# Patient Record
Sex: MALE | NOT HISPANIC OR LATINO | URBAN - METROPOLITAN AREA
[De-identification: names, ages, dates, MRNs, and addresses within clinical notes are randomized per-mention and may not be internally consistent; named-entity substitution may affect disease eponyms.]

---

## 2018-09-20 ENCOUNTER — IMPORTED ENCOUNTER (OUTPATIENT)
Dept: URBAN - METROPOLITAN AREA CLINIC 1 | Facility: CLINIC | Age: 68
End: 2018-09-20

## 2018-10-01 ENCOUNTER — IMPORTED ENCOUNTER (OUTPATIENT)
Dept: URBAN - METROPOLITAN AREA CLINIC 1 | Facility: CLINIC | Age: 68
End: 2018-10-01

## 2018-10-01 PROBLEM — Z79.84: Noted: 2018-10-01

## 2018-10-01 PROBLEM — E11.9: Noted: 2018-10-01

## 2018-10-01 PROBLEM — H11.153: Noted: 2018-10-01

## 2018-10-01 PROBLEM — H16.143: Noted: 2018-10-01

## 2018-10-01 PROBLEM — H04.123: Noted: 2018-10-01

## 2018-10-01 PROBLEM — H40.023: Noted: 2018-10-01

## 2018-10-01 PROBLEM — H25.813: Noted: 2018-10-01

## 2018-10-01 PROCEDURE — 92004 COMPRE OPH EXAM NEW PT 1/>: CPT

## 2018-10-01 PROCEDURE — 92015 DETERMINE REFRACTIVE STATE: CPT

## 2018-10-01 PROCEDURE — 92250 FUNDUS PHOTOGRAPHY W/I&R: CPT

## 2018-10-01 NOTE — PATIENT DISCUSSION
1.  DM Type II (Oral Meds) -- Without sign of diabetic retinopathy and no blot heme on dilated retinal examination today OU and no Macular Edema OU: Discussed the pathophysiology of diabetes and its effect on the eye and risk of blindness. Stressed the importance of strong glucose control. Advised of importance of at least yearly dilated examinations but to contact us immediately for any problems or concerns. 2. Cataracts OU -- Visually Significant secondary to glare discussed the risks benefits alternatives and limitations of cataract surgery. The patient stated a full understanding and a desire to proceed with the procedure. The patient will need to return for preop appointment with cataract measurements and to have any additional questions answered and start pre-operative eye drops as directed. Not examined by [facility] today. Phaco PCL OS first then OD. Otherwise follow-up in 3 WKS for a 10 / 24-2 HVF / OCT OU. 3.  Glaucoma Suspect OU (CD: 0.75 OU) -- IOP slightly elevated today at 23 OU. Positive family h/o Glaucoma. AA. Ordered & obtained Disc Photo today OU Photo showed Optic Nerve Cupping OU. Will have patient return in 3 WKS for further w/u & testing. Patient is considered high risk. Condition was discussed with patient and patient understands. Will continue to monitor patient for any progression in condition. Patient was advised to call us with any problems questions or concerns. 4.  LEXUS w/ PEK OU -- Recommend the frequent use of OTC AT's BID-QID OU (sample & coupon given). 5.  Pinguecula OU -- Use of sunglasses when exposed to UV light and observation is recommended. Letter to PCP. Return for a 10 / [place] [facility] / H and P appointment with Dr. Elyse Ball. Return for an appointment in 3 WKS for a 10 / 24-2 HVF / OCT OU with Dr. Tuyet Silva.

## 2018-10-25 ENCOUNTER — IMPORTED ENCOUNTER (OUTPATIENT)
Dept: URBAN - METROPOLITAN AREA CLINIC 1 | Facility: CLINIC | Age: 68
End: 2018-10-25

## 2018-10-25 PROBLEM — H40.023: Noted: 2018-10-25

## 2018-10-25 PROBLEM — H25.813: Noted: 2018-10-25

## 2018-10-25 PROCEDURE — 92083 EXTENDED VISUAL FIELD XM: CPT

## 2018-10-25 PROCEDURE — 99213 OFFICE O/P EST LOW 20 MIN: CPT

## 2018-10-25 PROCEDURE — 92133 CPTRZD OPH DX IMG PST SGM ON: CPT

## 2018-10-25 NOTE — PATIENT DISCUSSION
1.  Glaucoma Suspect OU (CD 0.75 OU): OCT and HVF WNL OU. IOP stable. AA. Family hx. Patient is considered high risk. Condition was discussed with patient and patient understands. Will continue to monitor patient for any progression in condition. Patient was advised to call us with any problems questions or concerns. 2.  Cataract OU: RTC as scheduled for Phaco/PCL w/ PMG 3.  LEXUS w/ PEK OU -- Recommend the frequent use of OTC AT's BID-QID OU (sample & coupon given). 4.  Pinguecula OU5. H/o DM w/o DR Evelio Sanchez for an appointment for Return as scheduled with Dr. Quinn Arriaza.

## 2018-10-26 ENCOUNTER — IMPORTED ENCOUNTER (OUTPATIENT)
Dept: URBAN - METROPOLITAN AREA CLINIC 1 | Facility: CLINIC | Age: 68
End: 2018-10-26

## 2018-10-26 PROBLEM — H16.143: Noted: 2018-10-26

## 2018-10-26 PROBLEM — H11.153: Noted: 2018-10-26

## 2018-10-26 PROBLEM — H04.123: Noted: 2018-10-26

## 2018-10-26 PROBLEM — H25.813: Noted: 2018-10-26

## 2018-10-26 PROBLEM — H40.023: Noted: 2018-10-26

## 2018-10-26 PROCEDURE — 92012 INTRM OPH EXAM EST PATIENT: CPT

## 2018-10-26 NOTE — PATIENT DISCUSSION
1.  Cataract OU:  Visually Significant secondary to glare discussed the risks benefits alternatives and limitations of cataract surgery. The patient stated a full understanding and a desire to proceed with the procedure. Instructed/ discussed with patient if gtts are expensive (over 150 dollars) to call our office so we can recommend alternatives. Pt understood. Pt understands they will need glasses post-op to achieve their best corrected vision. **Discussed with patient and  regarding patient needing to hold taking Metformin AM of surgery also discussed patient needs to skip night before sx dose of Metformin. Take all other medications as usual with sips of water only. Phaco PCL OS then OD. 2.  Glaucoma Suspect OU (CD 0.75 OU): IOP stable on no gtts Past w/u neg. AA. Family hx. Patient is considered high risk. 3.  LEXUS w/ PEK OU- Use ATs BID OU routinely. 4.  Pinguecula OU -- observe. 5.  H/o DM w/o DR OU - Glucose control. Return for an appointment in as scheduled for Phaco/ PCL OS then OD. with Dr. Cyn Vidal.

## 2018-11-07 ENCOUNTER — IMPORTED ENCOUNTER (OUTPATIENT)
Dept: URBAN - METROPOLITAN AREA CLINIC 1 | Facility: CLINIC | Age: 68
End: 2018-11-07

## 2018-11-08 ENCOUNTER — IMPORTED ENCOUNTER (OUTPATIENT)
Dept: URBAN - METROPOLITAN AREA CLINIC 1 | Facility: CLINIC | Age: 68
End: 2018-11-08

## 2018-11-08 PROBLEM — Z96.1: Noted: 2018-11-08

## 2018-11-08 PROCEDURE — 99024 POSTOP FOLLOW-UP VISIT: CPT

## 2018-11-19 ENCOUNTER — IMPORTED ENCOUNTER (OUTPATIENT)
Dept: URBAN - METROPOLITAN AREA CLINIC 1 | Facility: CLINIC | Age: 68
End: 2018-11-19

## 2018-11-19 PROBLEM — Z96.1: Noted: 2018-11-19

## 2018-11-19 PROBLEM — H25.811: Noted: 2018-11-19

## 2018-11-19 PROCEDURE — 92136 OPHTHALMIC BIOMETRY: CPT

## 2018-11-19 NOTE — PATIENT DISCUSSION
1.  Cataract OD: Visually Significant discussed the risks benefits alternatives and limitations of cataract surgery. The patient stated a full understanding and a desire to proceed with the procedure. The patient will need to start pre-operative eye drops as directed. Proceed w/ phaco PCL OD Pt understands they will need glasses post-op to achieve their best corrected vision. 2.  POW#1  CE/IOL Standard OS doing well. Discontinue OcufloxContinue Lotemax/Durezol/Prednisolone BID until gone. Continue Prolensa/Ilevro/Acular QD until gone. 3. Return for an appointment for sx OD with Dr. Josef Díaz.

## 2018-11-28 ENCOUNTER — IMPORTED ENCOUNTER (OUTPATIENT)
Dept: URBAN - METROPOLITAN AREA CLINIC 1 | Facility: CLINIC | Age: 68
End: 2018-11-28

## 2018-11-29 ENCOUNTER — IMPORTED ENCOUNTER (OUTPATIENT)
Dept: URBAN - METROPOLITAN AREA CLINIC 1 | Facility: CLINIC | Age: 68
End: 2018-11-29

## 2018-11-29 PROBLEM — Z96.1: Noted: 2018-11-29

## 2018-11-29 PROCEDURE — 99024 POSTOP FOLLOW-UP VISIT: CPT

## 2018-11-29 NOTE — PATIENT DISCUSSION
POD#1 CE/IOL OD doing well. Continue all 3 gtts as prescribed Use Besivance Tid OD for 5 days then D/cLotemax Bid ou Prolensa qd ouPost op Warnings Reiterated RTC as scheduled

## 2018-12-20 ENCOUNTER — IMPORTED ENCOUNTER (OUTPATIENT)
Dept: URBAN - METROPOLITAN AREA CLINIC 1 | Facility: CLINIC | Age: 68
End: 2018-12-20

## 2018-12-20 PROBLEM — Z96.1: Noted: 2018-12-20

## 2018-12-20 PROCEDURE — 99024 POSTOP FOLLOW-UP VISIT: CPT

## 2018-12-20 NOTE — PATIENT DISCUSSION
POM#1 CE/IOL OU (Standard OU) doing well. Use Lotemax BID OD till out Use Prolensa Qdaily OD till out MRX for glasses givenReturn for an appointment in October 30 with Dr. Cosmo Gupta.

## 2019-10-23 ENCOUNTER — IMPORTED ENCOUNTER (OUTPATIENT)
Dept: URBAN - METROPOLITAN AREA CLINIC 1 | Facility: CLINIC | Age: 69
End: 2019-10-23

## 2019-10-23 PROBLEM — H40.013: Noted: 2019-10-23

## 2019-10-23 PROBLEM — Z79.4: Noted: 2019-10-23

## 2019-10-23 PROBLEM — Z79.84: Noted: 2019-10-23

## 2019-10-23 PROBLEM — E11.9: Noted: 2019-10-23

## 2019-10-23 PROBLEM — H43.811: Noted: 2019-10-23

## 2019-10-23 PROBLEM — H26.493: Noted: 2019-10-23

## 2019-10-23 PROBLEM — H18.413: Noted: 2019-10-23

## 2019-10-23 PROBLEM — Z96.1: Noted: 2019-10-23

## 2019-10-23 PROCEDURE — 92014 COMPRE OPH EXAM EST PT 1/>: CPT

## 2019-10-23 NOTE — PATIENT DISCUSSION
1.  DM Type II (Insulin) -- Without sign of diabetic retinopathy and no blot heme on dilated retinal examination today OU No Macular Edema. Discussed the pathophysiology of diabetes and its effect on the eye and risk of blindness. Stressed the importance of strong glucose control. Advised of importance of at least yearly dilated examinations but to contact us immediately for any problems or concerns. 2. Glaucoma Suspect OU -- (0.85 OU) IOP 13 OU. AA. Past w/u negative. Patient is considered Low Risk. Will continue to monitor patient for any progression in condition. 3.  PCO OU -- (Posterior Capsule Opacification)  Observe and consider yag cap when pt feels pco visually significant and visual acuity decreases to appropriate level. 4. PVD w/o Tear OD - RD precautions. 5.  Pseudophakia OU -- Standard OU. 6. Arcus OUAll conditions discussed with patient and  today. Patient defers MRx today. Return for an appointment in 1 year fora 30/OCT with Dr. Nelson Vizcarra.

## 2020-10-21 ENCOUNTER — IMPORTED ENCOUNTER (OUTPATIENT)
Dept: URBAN - METROPOLITAN AREA CLINIC 1 | Facility: CLINIC | Age: 70
End: 2020-10-21

## 2020-10-21 PROBLEM — E11.9: Noted: 2020-10-21

## 2020-10-21 PROBLEM — H40.013: Noted: 2020-10-21

## 2020-10-21 PROBLEM — Z79.4: Noted: 2020-10-21

## 2020-10-21 PROBLEM — H43.811: Noted: 2020-10-21

## 2020-10-21 PROBLEM — H18.413: Noted: 2020-10-21

## 2020-10-21 PROBLEM — H26.493: Noted: 2020-10-21

## 2020-10-21 PROCEDURE — 92133 CPTRZD OPH DX IMG PST SGM ON: CPT

## 2020-10-21 PROCEDURE — 92014 COMPRE OPH EXAM EST PT 1/>: CPT

## 2020-10-21 NOTE — PATIENT DISCUSSION
1.  DM Type II (Insulin) without sign of diabetic retinopathy and no blot heme on dilated retinal examination today OU No Macular Edema -- Discussed the pathophysiology of diabetes and its effect on the eye and risk of blindness. Stressed the importance of strong glucose control. Advised of importance of at least yearly dilated examinations but to contact us immediately for any problems or concerns. 2. Glaucoma Suspect OU -- (0.85 OU) IOP 13 OU. AA. Past w/u negative. Patient is considered Low Risk. Will continue to monitor patient for any progression in condition. 3.  PCO OU -- (Posterior Capsule Opacification)   Observe and consider yag cap when pt feels pco visually significant and visual acuity decreases to appropriate level. 4. PVD w/o Tear OD -- Old Stable. RD Precautions. 5.  Arcus OUPatient deferred MRx today. Letter to PCP. Return for an appointment in 1 year 30/OCT with Dr. Dona Bates.

## 2022-04-02 ASSESSMENT — KERATOMETRY
OD_K1POWER_DIOPTERS: 41.25
OS_K1POWER_DIOPTERS: 40.75
OS_K2POWER_DIOPTERS: 41.50
OS_AXISANGLE2_DEGREES: 115
OD_AXISANGLE_DEGREES: 039
OD_K2POWER_DIOPTERS: 40.75
OS_AXISANGLE_DEGREES: 025
OD_AXISANGLE2_DEGREES: 129

## 2022-04-02 ASSESSMENT — TONOMETRY
OD_IOP_MMHG: 15
OD_IOP_MMHG: 16
OS_IOP_MMHG: 21
OD_IOP_MMHG: 20
OS_IOP_MMHG: 23
OS_IOP_MMHG: 15
OD_IOP_MMHG: 23
OS_IOP_MMHG: 17
OD_IOP_MMHG: 13
OS_IOP_MMHG: 13
OD_IOP_MMHG: 16
OS_IOP_MMHG: 16
OS_IOP_MMHG: 20
OS_IOP_MMHG: 16
OS_IOP_MMHG: 18
OD_IOP_MMHG: 21

## 2022-04-02 ASSESSMENT — VISUAL ACUITY
OD_SC: 20/25
OD_GLARE: 20/400
OS_GLARE: 20/400
OS_CC: 20/30
OS_SC: 20/30
OS_CC: 20/30
OD_CC: 20/25
OS_CC: 20/30
OS_SC: 20/25
OD_SC: 20/30
OS_CC: 20/25
OD_GLARE: 20/400
OS_CC: 20/40
OS_CC: 20/50
OS_SC: 20/30
OD_SC: 20/30
OD_CC: 20/20
OD_CC: 20/30
OS_GLARE: 20/400
OD_CC: 20/30

## 2022-08-15 NOTE — PATIENT DISCUSSION
Cataract DOES NOT appear visually significant. Severe recurrent major depressive disorder with psychotic features   F33.3

## 2023-01-11 NOTE — PATIENT DISCUSSION
My findings and recommendations are based on patient's symptoms, eye exam, diagnostic testing, and records. Never smoker